# Patient Record
Sex: MALE | Race: WHITE | Employment: OTHER | URBAN - METROPOLITAN AREA
[De-identification: names, ages, dates, MRNs, and addresses within clinical notes are randomized per-mention and may not be internally consistent; named-entity substitution may affect disease eponyms.]

---

## 2017-01-30 ENCOUNTER — OFFICE VISIT (OUTPATIENT)
Dept: FAMILY MEDICINE CLINIC | Age: 77
End: 2017-01-30

## 2017-01-30 VITALS
HEART RATE: 85 BPM | DIASTOLIC BLOOD PRESSURE: 80 MMHG | OXYGEN SATURATION: 95 % | HEIGHT: 71 IN | SYSTOLIC BLOOD PRESSURE: 130 MMHG | WEIGHT: 215 LBS | BODY MASS INDEX: 30.1 KG/M2 | TEMPERATURE: 98 F | RESPIRATION RATE: 16 BRPM

## 2017-01-30 DIAGNOSIS — J06.9 VIRAL UPPER RESPIRATORY TRACT INFECTION: Primary | ICD-10-CM

## 2017-01-30 DIAGNOSIS — Z20.818 EXPOSURE TO STREP THROAT: ICD-10-CM

## 2017-01-30 LAB
S PYO AG THROAT QL: NEGATIVE
VALID INTERNAL CONTROL?: YES

## 2017-01-30 RX ORDER — TOFACITINIB 5 MG/1
TABLET, FILM COATED ORAL 2 TIMES DAILY
COMMUNITY

## 2017-01-30 RX ORDER — CAPECITABINE 150 MG/1
1250 TABLET, FILM COATED ORAL 2 TIMES DAILY
COMMUNITY
End: 2017-01-30 | Stop reason: CLARIF

## 2017-01-30 NOTE — PATIENT INSTRUCTIONS
Symptomatic treatment (cough medication, expectorants, etc)    When you see your mother, if you have a cough wear a mask. Cough or no cough, sanitize your hands frequently around your mom and her roommate. Recheck as needed.

## 2017-01-30 NOTE — PROGRESS NOTES
HISTORY OF PRESENT ILLNESS  Yeimi Walker is a 78 y.o. male. HPI Comments: Mr Wilbur Vargas is here today because of a cough for two days. His wife was also just diagnosed with strep so he wants to be checked. The main reason he's being so cautious is because he is going to see him 8 year old mother tomorrow at [facility]. And doesn't want her to get anything. Pt has had no fever, chills. Cough is dry. His throat \"feels a little thick\". Cough   Pertinent negatives include no chest pain and no headaches. Review of Systems   Constitutional: Negative for chills, fever and malaise/fatigue. HENT: Positive for sore throat. Negative for congestion, ear pain, nosebleeds and tinnitus. Eyes: Negative for blurred vision and discharge. Respiratory: Positive for cough. Negative for hemoptysis, sputum production and wheezing. Cardiovascular: Negative for chest pain and palpitations. Gastrointestinal: Negative for heartburn. Genitourinary: Negative for dysuria and urgency. Neurological: Negative for headaches. Physical Exam   Constitutional: He appears well-developed and well-nourished. HENT:   Right Ear: Tympanic membrane, external ear and ear canal normal.   Left Ear: Tympanic membrane, external ear and ear canal normal.   Nose: Nose normal.   Mouth/Throat: Uvula is midline, oropharynx is clear and moist and mucous membranes are normal. No posterior oropharyngeal erythema. Eyes: Conjunctivae are normal. Pupils are equal, round, and reactive to light. Right conjunctiva is not injected. Left conjunctiva is not injected. Neck: Neck supple. No thyromegaly present. Cardiovascular: Normal rate and regular rhythm. Pulmonary/Chest: Effort normal and breath sounds normal. No respiratory distress. He has no wheezes. He has no rales. Lymphadenopathy:     He has no cervical adenopathy. Skin: No rash noted. Nursing note and vitals reviewed.     Results for orders placed or performed in visit on 01/30/17   AMB POC RAPID STREP A   Result Value Ref Range    VALID INTERNAL CONTROL POC Yes     Group A Strep Ag Negative Negative       ASSESSMENT and PLAN    ICD-10-CM ICD-9-CM    1. Viral upper respiratory tract infection J06.9 465.9     B97.89     2.  Exposure to strep throat Z20.818 V01.89 AMB POC RAPID STREP A       AVS instructions reviewed with patient, pt verbalized understanding

## 2017-01-30 NOTE — MR AVS SNAPSHOT
Visit Information Date & Time Provider Department Dept. Phone Encounter #  
 1/30/2017 12:45 PM [facility] [phone] [id number] Follow-up Instructions Return if symptoms worsen or fail to improve. Upcoming Health Maintenance Date Due DTaP/Tdap/Td series (1 - Tdap) 12/1/1963 FOBT Q 1 YEAR AGE 50-75 12/1/1992 ZOSTER VACCINE AGE 60> 12/1/2002 GLAUCOMA SCREENING Q2Y 12/1/2007 Pneumococcal 65+ Low/Medium Risk (1 of 2 - PCV13) 12/1/2007 MEDICARE YEARLY EXAM 12/1/2007 INFLUENZA AGE 9 TO ADULT 8/1/2016 Allergies as of 1/30/2017  Review Complete On: 1/30/2017 By: Danny Lozada LPN No Known Allergies Current Immunizations  Never Reviewed No immunizations on file. Not reviewed this visit You Were Diagnosed With   
  
 Codes Comments Viral upper respiratory tract infection    -  Primary ICD-10-CM: J06.9, B97.89 ICD-9-CM: 465.9 Exposure to strep throat     ICD-10-CM: Z20.818 ICD-9-CM: V01.89 Vitals BP Pulse Temp Resp Height(growth percentile) Weight(growth percentile) 130/80 (BP 1 Location: Right arm, BP Patient Position: Sitting) 85 98 °F (36.7 °C) (Oral) 16 5' 11\" (1.803 m) 215 lb (97.5 kg) SpO2 BMI Smoking Status 95% 29.99 kg/m2 Former Smoker Vitals History BMI and BSA Data Body Mass Index Body Surface Area  
 29.99 kg/m 2 2.21 m 2 Preferred Pharmacy Pharmacy Name Phone [id number], [address] [place] [address] Asim Chilel [phone] Your Updated Medication List  
  
   
This list is accurate as of: 1/30/17 12:54 PM.  Always use your most recent med list.  
  
  
  
  
 ANN 5-40 mg Tab Generic drug:  amLODIPine-Olmesartan  
  
 folic acid 1 mg tablet Commonly known as:  Google LORazepam 1 mg tablet Commonly known as:  ATIVAN Take 1 Tab by mouth every six (6) hours as needed (VERTIGO).  Max Daily Amount: 4 mg.  
  
 meclizine 25 mg tablet Commonly known as:  ANTIVERT Take 1 Tab by mouth three (3) times daily as needed. Indications: VERTIGO  
  
 methotrexate (PF) 10 mg/0.2 mL Atin  
0.6 mL. XELJANZ 5 mg tab Generic drug:  tofacitinib Take  by mouth two (2) times a day. We Performed the Following AMB POC RAPID STREP A [85174 CPT(R)] Follow-up Instructions Return if symptoms worsen or fail to improve. Patient Instructions Symptomatic treatment (cough medication, expectorants, etc) When you see your mother, if you have a cough wear a mask. Cough or no cough, sanitize your hands frequently around your mom and her roommate. Recheck as needed. Introducing \[facility]\"" & HEALTH SERVICES! Moar Stinson introduces [email or web address] patient portal. Now you can access parts of your medical record, email your doctor's office, and request medication refills online. 1. In your internet browser, go to [email or web address] 2. Click on the First Time User? Click Here link in the Sign In box. You will see the New Member Sign Up page. 3. Enter your [email or web address] Access Code exactly as it appears below. You will not need to use this code after youve completed the sign-up process. If you do not sign up before the expiration date, you must request a new code. · [email or web address] Access Code: [email or web address] Expires: 2/16/2017 10:33 AM 
 
4. Enter the last four digits of your Social Security Number (xxxx) and Date of Birth (mm/dd/yyyy) as indicated and click Submit. You will be taken to the next sign-up page. 5. Create a Yatangot ID. This will be your [email or web address] login ID and cannot be changed, so think of one that is secure and easy to remember. 6. Create a [email or web address] password. You can change your password at any time. 7. Enter your Password Reset Question and Answer. This can be used at a later time if you forget your password. 8. Enter your e-mail address. You will receive e-mail notification when new information is available in [address] 9. Click Sign Up. You can now view and download portions of your medical record. 10. Click the Download Summary menu link to download a portable copy of your medical information. If you have questions, please visit the Frequently Asked Questions section of the MakeLeaps website. Remember, MakeLeaps is NOT to be used for urgent needs. For medical emergencies, dial 911. Now available from your iPhone and Android! Please provide this summary of care documentation to your next provider. If you have any questions after today's visit, please call [phone].

## 2017-01-30 NOTE — PROGRESS NOTES
Rm 1  Pt presents to the clinic complaining of a nonproductive cough x 2 days. Pt's wants to be sure he is not contagious. Flu shot requested: no    Depression Screening Completed: yes    Learning Assessment Completed: yes    Abuse Screening Completed: n/a    Health Maintenance reviewed and discussed per provider: yes    Advance Directive:    1. Do you have an advance directive in place? Patient Reply: no    2. If not, would you like material regarding how to put one in place? Patient Reply: no     Coordination of Care:    1. Have you been to the ER, urgent care clinic since your last visit? Hospitalized since your last visit? no    2. Have you seen or consulted any other health care providers outside of the [facility] since your last visit? Include any pap smears or colon screening.  no

## 2017-02-21 NOTE — PATIENT DISCUSSION
AMD (DRY), OU___:  PRESCRIBE AREDS 2 VITAMINS / AMSLER GRID QD/ UV PROTECTION. SMOKING CESSATION EMPHASIZED. RETURN FOR FOLLOW-UP AS SCHEDULED.

## 2017-02-21 NOTE — PATIENT DISCUSSION
Surgery Counseling: I have discussed the option of scheduling surgery versus following, as well as the risks, benefits and alternatives of cataract surgery with the patient. It was explained that the surgery is medically indicated at this time, and it can be performed at the patient's option as delaying will cause no further deterioration, therefore there is no rush and there is no harm in waiting to have surgery. It was also explained that there is no guarantee that removing the cataract will improve their vision. The patient understands and desires to proceed with cataract surgery with the implantation of an intraocular lens to improve vision for _DRIVING AND READING____. I have given the patient the prescribed regimen of the all-in-one drop to use before and after cataract surgery. They have elected to use the all-in-one option of Pred/Gati/Nepaf(prednisolone acetate,gatifloxacin,and nepafenac. Patient to administer as directed.

## 2018-05-10 ENCOUNTER — IMPORTED ENCOUNTER (OUTPATIENT)
Dept: URBAN - METROPOLITAN AREA CLINIC 1 | Facility: CLINIC | Age: 78
End: 2018-05-10

## 2018-05-10 PROBLEM — H43.813: Noted: 2018-05-10

## 2018-05-10 PROBLEM — D48.5: Noted: 2018-05-10

## 2018-05-10 PROBLEM — H25.813: Noted: 2018-05-10

## 2018-05-10 PROBLEM — H04.123: Noted: 2018-05-10

## 2018-05-10 PROBLEM — H35.033: Noted: 2018-05-10

## 2018-05-10 PROBLEM — D23.12: Noted: 2018-05-10

## 2018-05-10 PROCEDURE — 92015 DETERMINE REFRACTIVE STATE: CPT

## 2018-05-10 PROCEDURE — 92014 COMPRE OPH EXAM EST PT 1/>: CPT

## 2018-05-10 NOTE — PATIENT DISCUSSION
Lesion lower lid benign OS - The patient has a lesion of the left lower eyelid which appears benign. Measurements were taken and observation at regular intervals was recommended. The patient was asked to report  if there is any growth.

## 2018-05-10 NOTE — PATIENT DISCUSSION
1.  Cataract OU: Observe for now without intervention. The patient was advised to contact us if any change or worsening of vision2. Dry Eyes OU: Stable. The continuation of artificial tears were recommended. 3.  RLL Mass of uncertain behavior- Observe. 4.  Papilloma LL- appears benign. 5.  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 6.  GR I Hypertensive Retinopathy OU- Stable continue HTN Control7. Return for an appointment for a 30 /glare in 1 year with Dr. Agustin Aragon.

## 2018-08-06 ENCOUNTER — IMPORTED ENCOUNTER (OUTPATIENT)
Dept: URBAN - METROPOLITAN AREA CLINIC 1 | Facility: CLINIC | Age: 78
End: 2018-08-06

## 2018-08-06 PROBLEM — G43.909: Noted: 2018-08-06

## 2018-08-06 PROBLEM — G45.9: Noted: 2018-08-06

## 2018-08-06 PROCEDURE — 92012 INTRM OPH EXAM EST PATIENT: CPT

## 2018-08-06 NOTE — PATIENT DISCUSSION
Transient Ischemic Attack - Condition and reassurance given. Recommend vascular w/u per PCP. Pt states his PCP has already ordered a Carotid Duplex. If symptoms return advised pt to call our office immediately. Letter sent to PCP. Return as scheduled in May with Dr. Cb Almonte.

## 2018-08-20 ENCOUNTER — OFFICE VISIT (OUTPATIENT)
Dept: FAMILY MEDICINE CLINIC | Age: 78
End: 2018-08-20

## 2018-08-20 VITALS
BODY MASS INDEX: 31.56 KG/M2 | TEMPERATURE: 98.3 F | WEIGHT: 225.4 LBS | RESPIRATION RATE: 12 BRPM | OXYGEN SATURATION: 94 % | SYSTOLIC BLOOD PRESSURE: 147 MMHG | HEIGHT: 71 IN | DIASTOLIC BLOOD PRESSURE: 89 MMHG | HEART RATE: 80 BPM

## 2018-08-20 DIAGNOSIS — H92.01 RIGHT EAR PAIN: Primary | ICD-10-CM

## 2018-08-20 RX ORDER — CHOLECALCIFEROL TAB 125 MCG (5000 UNIT) 125 MCG
TAB ORAL DAILY
COMMUNITY

## 2018-08-20 RX ORDER — NEOMYCIN SULFATE, POLYMYXIN B SULFATE AND HYDROCORTISONE 10; 3.5; 1 MG/ML; MG/ML; [USP'U]/ML
3 SUSPENSION/ DROPS AURICULAR (OTIC) 4 TIMES DAILY
Qty: 10 ML | Refills: 0 | Status: SHIPPED | OUTPATIENT
Start: 2018-08-20 | End: 2018-08-25

## 2018-08-20 RX ORDER — LANOLIN ALCOHOL/MO/W.PET/CERES
500 CREAM (GRAM) TOPICAL DAILY
COMMUNITY

## 2018-08-20 NOTE — PATIENT INSTRUCTIONS
No treatment for now. If the ear canal becomes tender to move or touch, fill the prescription and use as directed. Recheck as needed.

## 2018-08-20 NOTE — MR AVS SNAPSHOT
[address] [id number] 
[phone] Patient: Branden Florentino MRN: [id number] Visit Information Date & Time Provider Department Dept. Phone Encounter #  
 8/20/2018 12:45 PM Mervin Zamorano [facility] [phone] [id number] Follow-up Instructions Return if symptoms worsen or fail to improve. Upcoming Health Maintenance Date Due DTaP/Tdap/Td series (1 - Tdap) 12/1/1963 ZOSTER VACCINE AGE 60> 10/1/2002 GLAUCOMA SCREENING Q2Y 12/1/2007 Pneumococcal 65+ Low/Medium Risk (1 of 2 - PCV13) 12/1/2007 MEDICARE YEARLY EXAM 3/14/2018 Influenza Age 5 to Adult 8/1/2018 Allergies as of 8/20/2018  Review Complete On: 8/20/2018 By: Rubens Moyer No Known Allergies Current Immunizations  Never Reviewed No immunizations on file. Not reviewed this visit You Were Diagnosed With   
  
 Codes Comments Right ear pain    -  Primary ICD-10-CM: H92.01 
ICD-9-CM: 388.70 Vitals BP Pulse Temp Resp Height(growth percentile) Weight(growth percentile) 147/89 (BP 1 Location: Right arm, BP Patient Position: Sitting) 80 98.3 °F (36.8 °C) (Oral) 12 5' 11\" (1.803 m) 225 lb 6.4 oz (102.2 kg) SpO2 BMI Smoking Status 94% 31.44 kg/m2 Former Smoker Vitals History BMI and BSA Data Body Mass Index Body Surface Area  
 31.44 kg/m 2 2.26 m 2 Preferred Pharmacy Pharmacy Name Phone [id number] Wyatt Specter [phone] Your Updated Medication List  
  
   
This list is accurate as of 8/20/18  1:13 PM.  Always use your most recent med list.  
  
  
  
  
 ANN 5-40 mg Tab Generic drug:  amLODIPine-Olmesartan  
  
 cyanocobalamin 500 mcg tablet Commonly known as:  VITAMIN B12 Take 500 mcg by mouth daily.   
  
 neomycin-polymyxin-hydrocortisone (buffered) 3.5-10,000-1 mg/mL-unit/mL-% otic suspension Commonly known as:  Erroll Life Administer 3 Drops in right ear four (4) times daily for 5 days. VITAMIN D3 5,000 unit Tab tablet Generic drug:  cholecalciferol (VITAMIN D3) Take  by mouth daily. XELJANZ 5 mg tab Generic drug:  tofacitinib Take  by mouth two (2) times a day. Prescriptions Printed Refills  
 neomycin-polymyxin-hydrocortisone, buffered, (PEDIOTIC) 3.5-10,000-1 mg/mL-unit/mL-% otic suspension 0 Sig: Administer 3 Drops in right ear four (4) times daily for 5 days. Class: Print Route: Right Ear Follow-up Instructions Return if symptoms worsen or fail to improve. Patient Instructions No treatment for now. If the ear canal becomes tender to move or touch, fill the prescription and use as directed. Recheck as needed. Introducing [facility] & HEALTH SERVICES! Gracia Grant introduces DigitalTown patient portal. Now you can access parts of your medical record, email your doctor's office, and request medication refills online. 1. In your internet browser, go to [email or web address] 2. Click on the First Time User? Click Here link in the Sign In box. You will see the New Member Sign Up page. 3. Enter your DigitalTown Access Code exactly as it appears below. You will not need to use this code after youve completed the sign-up process. If you do not sign up before the expiration date, you must request a new code. · DigitalTown Access Code: [email or web address] Expires: 11/18/2018  1:13 PM 
 
4. Enter the last four digits of your Social Security Number (xxxx) and Date of Birth (mm/dd/yyyy) as indicated and click Submit. You will be taken to the next sign-up page. 5. Create a Kaleiot ID. This will be your DigitalTown login ID and cannot be changed, so think of one that is secure and easy to remember. 6. Create a Kaleiot password. You can change your password at any time. 7. Enter your Password Reset Question and Answer. This can be used at a later time if you forget your password. 8. Enter your e-mail address. You will receive e-mail notification when new information is available in [address] 9. Click Sign Up. You can now view and download portions of your medical record. 10. Click the Download Summary menu link to download a portable copy of your medical information. If you have questions, please visit the Frequently Asked Questions section of the [employer] website. Remember, [employer] is NOT to be used for urgent needs. For medical emergencies, dial 911. Now available from your iPhone and Android! Please provide this summary of care documentation to your next provider. If you have any questions after today's visit, please call [phone].

## 2018-08-20 NOTE — PROGRESS NOTES
Rm 1  Pt presents to the clinic   Chief Complaint   Patient presents with    Ear Pain     right ear, sharp, about every 2-3 hours x 2 days ago from the beach

## 2018-08-20 NOTE — PROGRESS NOTES
HISTORY OF PRESENT ILLNESS  Nain Johnston is a 78 y.o. male. HPI Comments: Mr. Holli Rodriguez c/o intermittent R ear pain for several days, after going to the beach. No cold symptoms, no drainage. Ear Pain   Pertinent negatives include no chest pain, no abdominal pain, no headaches and no shortness of breath. Review of Systems   Constitutional: Negative for chills and fever. HENT: Positive for ear pain. Negative for congestion, ear discharge and sinus pain. Eyes: Negative for blurred vision and double vision. Respiratory: Negative for cough and shortness of breath. Cardiovascular: Negative for chest pain and palpitations. Gastrointestinal: Negative for abdominal pain, nausea and vomiting. Neurological: Negative for headaches. Physical Exam   Constitutional: He is oriented to person, place, and time. He appears well-developed and well-nourished. HENT:   R ear: ear canal fine, TM looks normal.   Eyes: Pupils are equal, round, and reactive to light. Neck: Neck supple. Cardiovascular: Normal rate and regular rhythm. Pulmonary/Chest: Effort normal and breath sounds normal. No respiratory distress. He has no wheezes. He has no rales. Abdominal: Soft. He exhibits no distension. Lymphadenopathy:     He has no cervical adenopathy. Neurological: He is alert and oriented to person, place, and time. Nursing note and vitals reviewed. ASSESSMENT and PLAN    ICD-10-CM ICD-9-CM    1.  Right ear pain H92.01 388.70 neomycin-polymyxin-hydrocortisone, buffered, (PEDIOTIC) 3.5-10,000-1 mg/mL-unit/mL-% otic suspension

## 2018-10-31 NOTE — PATIENT DISCUSSION
Surgery Counseling: I have discussed the option of scheduling surgery versus following, as well as the risks, benefits and alternatives of cataract surgery with the patient. It was explained that the surgery is medically indicated at this time, and it can be performed at the patient's option as delaying will cause no further deterioration, therefore there is no rush and there is no harm in waiting to have surgery. It was also explained that there is no guarantee that removing the cataract will improve their vision. The patient understands and desires to proceed with cataract surgery with the implantation of an intraocular lens to improve vision for ____driving and reading _______. I have given the patient the prescribed regimen of the all-in-one drop to use before and after cataract surgery. They have elected to use the all-in-one option of Pred/Gati/Brom(prednisolone acetate,gatifloxacin,and bromfenac. Patient to administer as directed.

## 2018-10-31 NOTE — PATIENT DISCUSSION
CATARACTS, OU - VISUALLY SIGNIFICANT. SCHEDULE _od_ FIRST THEN LATER IN __os  DISCUSSED OPTION OF _STD OU_______________VS MULTIFOCAL OU__________________. PATIENT UNDERSTANDS AND DESIRES _STD OU____.

## 2018-12-28 NOTE — PATIENT DISCUSSION
(Z63.521) Other secondary cataract, left eye - Assesment : Trace posterior capsule opacification present. - Plan : Monitor for Changes. Advised patient to call our office with decreased vision or increased symptoms.

## 2018-12-28 NOTE — PATIENT DISCUSSION
(H43.811) Vitreous degeneration, right eye - Assesment : Examination revealed a posterior vitreous detachment. No holes or tears 360 Diagnoses discussed with patient - Plan : Handouts given on posterior vitreous detachment and risk factors discussed for retinal detachment development. Advised to call immediately with any changes.  Refer to retina specialist for further evaluation Rtc PRN

## 2019-01-02 ENCOUNTER — OFFICE VISIT (OUTPATIENT)
Dept: FAMILY MEDICINE CLINIC | Age: 79
End: 2019-01-02

## 2019-01-02 VITALS
RESPIRATION RATE: 16 BRPM | BODY MASS INDEX: 31.92 KG/M2 | DIASTOLIC BLOOD PRESSURE: 84 MMHG | SYSTOLIC BLOOD PRESSURE: 167 MMHG | HEART RATE: 77 BPM | WEIGHT: 228 LBS | HEIGHT: 71 IN | TEMPERATURE: 98 F | OXYGEN SATURATION: 96 %

## 2019-01-02 DIAGNOSIS — J06.9 VIRAL URI WITH COUGH: Primary | ICD-10-CM

## 2019-01-02 DIAGNOSIS — I10 ESSENTIAL HYPERTENSION: ICD-10-CM

## 2019-01-02 RX ORDER — CODEINE PHOSPHATE AND GUAIFENESIN 10; 100 MG/5ML; MG/5ML
10 SOLUTION ORAL
Qty: 180 ML | Refills: 0 | Status: SHIPPED | OUTPATIENT
Start: 2019-01-02 | End: 2019-04-01

## 2019-01-02 NOTE — PATIENT INSTRUCTIONS
Take the cough medicine as prescribed, as needed. If the cough is mild, take 1 tsp, otherwise take 2 tsp. Symptomatic over the counter treatment like zycam and Vitamin C. Recheck Monday if you're not doing better. Get back on the Jasvir

## 2019-01-02 NOTE — PROGRESS NOTES
HISTORY OF PRESENT ILLNESS Reta Ospina is a 70 y.o. male. Mr. Robert Holland is here because of a cough for 6-7 days. No fever, chills, sore throat. Non-productive. No headache. His BP is slightly up today, he hasn't taken his Jasvir today yet Review of Systems Constitutional: Positive for malaise/fatigue. Negative for chills and fever. HENT: Negative for congestion, ear pain, nosebleeds, sore throat and tinnitus. Eyes: Negative for blurred vision and discharge. Respiratory: Positive for cough. Negative for hemoptysis, sputum production and wheezing. Cardiovascular: Negative for chest pain and palpitations. Gastrointestinal: Negative for heartburn. Genitourinary: Negative for dysuria and urgency. Neurological: Positive for headaches. Physical Exam  
Constitutional: He appears well-developed and well-nourished. HENT:  
Right Ear: Tympanic membrane, external ear and ear canal normal.  
Left Ear: Tympanic membrane, external ear and ear canal normal.  
Nose: Nose normal.  
Mouth/Throat: Uvula is midline, oropharynx is clear and moist and mucous membranes are normal. No posterior oropharyngeal erythema. Eyes: Conjunctivae are normal. Pupils are equal, round, and reactive to light. Right conjunctiva is not injected. Left conjunctiva is not injected. Neck: Neck supple. No thyromegaly present. Cardiovascular: Normal rate and regular rhythm. Pulmonary/Chest: Effort normal and breath sounds normal. No respiratory distress. He has no wheezes. He has no rales. Abdominal: He exhibits no distension. Lymphadenopathy:  
  He has no cervical adenopathy. Skin: No rash noted. Nursing note and vitals reviewed. ASSESSMENT and PLAN 
  ICD-10-CM ICD-9-CM 1. Viral URI with cough J06.9 465.9 guaiFENesin-codeine (ROBITUSSIN AC) 100-10 mg/5 mL solution B97.89    
2. Essential hypertension I10 401.9

## 2019-01-02 NOTE — PROGRESS NOTES
Rm:2 
 
Chief Complaint Patient presents with  Cold Symptoms  
  cough, sinus drainage Depression Screening: PHQ over the last two weeks 1/2/2019 1/2/2019 8/20/2018 11/18/2016 11/18/2016 Little interest or pleasure in doing things Not at all Not at all Not at all Not at all Not at all Feeling down, depressed, irritable, or hopeless Not at all Not at all Not at all Not at all Not at all Total Score PHQ 2 0 0 0 0 0 Learning Assessment: 
Learning Assessment 11/18/2016 PRIMARY LEARNER Patient HIGHEST LEVEL OF EDUCATION - PRIMARY LEARNER  2 YEARS OF COLLEGE  
BARRIERS PRIMARY LEARNER NONE  
CO-LEARNER CAREGIVER No  
PRIMARY LANGUAGE ENGLISH  
LEARNER PREFERENCE PRIMARY LISTENING  
ANSWERED BY patient RELATIONSHIP SELF Abuse Screening: No flowsheet data found. Health Maintenance reviewed and discussed per provider: yes Coordination of Care: 1. Have you been to the ER, urgent care clinic since your last visit? Hospitalized since your last visit? no 
 
2. Have you seen or consulted any other health care providers outside of the [address] [place] [address] since your last visit? Include any pap smears or colon screening.  no

## 2019-01-04 NOTE — PROCEDURE NOTE: CLINICAL
PROCEDURE NOTE: Laser for Retinal Tear OD. Diagnosis: Retinal Hole. Prior to laser, risks/benefits/alternatives to laser discussed including loss of vision, decreased peripheral and night vision, need for more laser and/or surgery and patient wished to proceed. An informed consent was obtained and no assurances or guarantees were given. Spot size: * um. Pulse power: 400 mW. Pulse duration: 100 ms. Number of pulses: 318. Procedure Time: 4:53 PM . Patient tolerated procedure well. There were no complications. Post-op instructions given. Patient given office phone number/answering service number and advised to call immediately should there be loss of vision or pain, or should they have any other questions or concerns. Muna Reza

## 2019-01-04 NOTE — PATIENT DISCUSSION
Capsular haze appears visually significant, RECOMMEND CONSULT with DR Zachary Pope for possible YAG capsulotomy.

## 2019-01-04 NOTE — PATIENT DISCUSSION
NO ^ [address] [place] TO SUGGEST OPTIC NEURITIS OR PAPELLEDEMA DESPITE MARGIN OF NERVE APPEARING INDISTINCT.

## 2019-01-11 NOTE — PROCEDURE NOTE: CLINICAL
PROCEDURE NOTE: Laser for Retinal Tear OS. Diagnosis: Horseshoe Tear. Prior to laser, risks/benefits/alternatives to laser discussed including loss of vision, decreased peripheral and night vision, need for more laser and/or surgery and patient wished to proceed. An informed consent was obtained and no assurances or guarantees were given. Spot size: 200 um. Pulse power: 200 mW. Pulse duration: 100 ms. Number of pulses: 114. Procedure Time: 6858. Patient tolerated procedure well. There were no complications. Post-op instructions given. Patient given office phone number/answering service number and advised to call immediately should there be loss of vision or pain, or should they have any other questions or concerns. She Roberts

## 2019-01-11 NOTE — PATIENT DISCUSSION
Capsular haze appears visually significant, RECOMMEND CONSULT with DR Demi Love for possible YAG capsulotomy.

## 2019-02-08 NOTE — PATIENT DISCUSSION
Capsular haze appears visually significant, RECOMMEND CONSULT with DR Michele Baez for possible YAG capsulotomy.

## 2019-03-29 NOTE — PATIENT DISCUSSION
(H26.549) Other secondary cataract, right eye - Assesment : Trace posterior capsule opacification present OD. - Plan : Discussed that opacity is the cause of the decreased vision. Discussed the risks and benefits of performing a YAG Capsulotomy including the risk of floaters, retinal detachment, and retinal swelling. Patient understands to expect floaters after the YAG capsulotomy procedures and understands that they may remain indefinitely. Recommend that patient undergo a YAG Capsulotomy OD (Right Eye).   RTC 2 weeks follow up

## 2019-05-09 ENCOUNTER — IMPORTED ENCOUNTER (OUTPATIENT)
Dept: URBAN - METROPOLITAN AREA CLINIC 1 | Facility: CLINIC | Age: 79
End: 2019-05-09

## 2019-05-09 PROBLEM — H04.123: Noted: 2019-05-09

## 2019-05-09 PROBLEM — H25.813: Noted: 2019-05-09

## 2019-05-09 PROBLEM — H43.813: Noted: 2019-05-09

## 2019-05-09 PROBLEM — H16.143: Noted: 2019-05-09

## 2019-05-09 PROBLEM — H35.033: Noted: 2019-05-09

## 2019-05-09 PROCEDURE — 92014 COMPRE OPH EXAM EST PT 1/>: CPT

## 2019-05-09 PROCEDURE — 92015 DETERMINE REFRACTIVE STATE: CPT

## 2019-05-09 NOTE — PATIENT DISCUSSION
1.  Cataract OU: Observe for now without intervention. The patient was advised to contact us if any change or worsening of vision2. LOGAN w/ PEK OU- Recommend ATs TID OU routinely 3. GR I Hypertensive Retinopathy OU- Stable continue HTN Control4. PVD OU - RD precautions. 5.  RLL Mass of uncertain behavior- Observe. 6.  Papilloma LL- appears benign. MRX for glasses given. Return for an appointment in 1 year 30/glare with Dr. Scotty Reid.
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
## 2020-03-06 NOTE — PATIENT DISCUSSION
Capsular haze appears visually significant, RECOMMEND CONSULT with DR Minnie Ford for possible YAG capsulotomy.

## 2020-05-11 ENCOUNTER — IMPORTED ENCOUNTER (OUTPATIENT)
Dept: URBAN - METROPOLITAN AREA CLINIC 1 | Facility: CLINIC | Age: 80
End: 2020-05-11

## 2020-05-11 PROBLEM — H04.123: Noted: 2020-05-11

## 2020-05-11 PROBLEM — H25.813: Noted: 2020-05-11

## 2020-05-11 PROBLEM — H16.143: Noted: 2020-05-11

## 2020-05-11 PROBLEM — H35.033: Noted: 2020-05-11

## 2020-05-11 PROBLEM — H43.813: Noted: 2020-05-11

## 2020-05-11 PROCEDURE — 92014 COMPRE OPH EXAM EST PT 1/>: CPT

## 2020-05-11 NOTE — PATIENT DISCUSSION
1.  Cataract OU -- Observe until patient symptomatic. 2.  LOGAN w/ PEK OU -- Cont ATs BID OU. 3.  GR I Hypertensive Retinopathy OU -- Stable continue HTN Control4. PVD OU -- RD precautions. 5.  RLL Mass of uncertain behavior -- Observe. 6.  Papilloma LL -- Appears benign. Pt deferes Mrx. Return for an appointment in 6 mon 10/dfe/glarandell with Dr. Santiago Stokes.

## 2020-11-09 ENCOUNTER — IMPORTED ENCOUNTER (OUTPATIENT)
Dept: URBAN - METROPOLITAN AREA CLINIC 1 | Facility: CLINIC | Age: 80
End: 2020-11-09

## 2020-11-09 PROBLEM — H04.123: Noted: 2020-11-09

## 2020-11-09 PROBLEM — H35.033: Noted: 2020-11-09

## 2020-11-09 PROBLEM — H25.813: Noted: 2020-11-09

## 2020-11-09 PROBLEM — H40.053: Noted: 2020-11-09

## 2020-11-09 PROBLEM — H16.143: Noted: 2020-11-09

## 2020-11-09 PROCEDURE — 92012 INTRM OPH EXAM EST PATIENT: CPT

## 2020-11-09 NOTE — PATIENT DISCUSSION
1.  Cataract OU -- Observe for now without intervention. The patient was advised to contact us if any change or worsening of vision2. OHTN -- IOP 22 OU today. Observe for changes/progression. 3. LOGAN w/ PEK OU -- Cont ATs BID OU. 4.  GR I Hypertensive Retinopathy OU- Stable continue HTN Control5. PVD OU -- RD precautions. 6.  RLL Mass of uncertain behavior -- Observe. 7.  Papilloma LL -- Appears benign. Return for an appointment in 1 year for a 30/glare with Dr. Corona Díaz.

## 2021-02-05 NOTE — PATIENT DISCUSSION
Capsular haze appears visually significant, RECOMMEND CONSULT with DR Claudia Yen for possible YAG capsulotomy.

## 2021-06-23 NOTE — PATIENT DISCUSSION
MIRTA RUL__: PRESCRIBED WARM COMPRESSES, EYELID SCRUBS AND _MAXITROL_ FOLLOW UP WITH DR. Eleni Mccann IF PROBLEM PERSISTS.

## 2021-07-15 PROBLEM — K43.9 VENTRAL HERNIA WITHOUT OBSTRUCTION OR GANGRENE: Status: ACTIVE | Noted: 2018-08-06

## 2021-07-15 PROBLEM — M13.0 POLYARTHRITIS: Status: ACTIVE | Noted: 2021-07-15

## 2021-07-15 PROBLEM — R82.90 ABNORMAL URINALYSIS: Status: ACTIVE | Noted: 2021-07-15

## 2021-07-15 PROBLEM — E55.9 VITAMIN D DEFICIENCY: Status: ACTIVE | Noted: 2019-11-21

## 2021-07-15 PROBLEM — M47.812 OSTEOARTHRITIS OF CERVICAL SPINE: Status: ACTIVE | Noted: 2020-09-03

## 2021-07-15 PROBLEM — E78.00 ELEVATED CHOLESTEROL: Status: ACTIVE | Noted: 2018-09-04

## 2021-07-15 PROBLEM — D17.1 LIPOMA OF SKIN AND SUBCUTANEOUS TISSUE OF TRUNK: Status: ACTIVE | Noted: 2021-07-15

## 2021-07-15 PROBLEM — K43.2 INCISIONAL HERNIA: Status: ACTIVE | Noted: 2021-04-09

## 2021-07-15 PROBLEM — M75.82 TENDINITIS OF LEFT ROTATOR CUFF: Status: ACTIVE | Noted: 2021-07-15

## 2021-07-15 PROBLEM — Z87.891 EX-SMOKER: Status: ACTIVE | Noted: 2021-07-15

## 2021-07-15 PROBLEM — M75.80 ROTATOR CUFF TENDINITIS: Status: ACTIVE | Noted: 2021-07-15

## 2021-07-15 PROBLEM — T41.45XA ADVERSE EFFECT OF ANESTHESIA: Status: ACTIVE | Noted: 2021-07-15

## 2021-07-15 PROBLEM — Z79.52 LONG TERM CURRENT USE OF SYSTEMIC STEROIDS: Status: ACTIVE | Noted: 2021-07-15

## 2021-07-15 PROBLEM — K63.5 COLON POLYP: Status: ACTIVE | Noted: 2021-07-15

## 2021-07-15 PROBLEM — Z87.19 S/P REPAIR OF VENTRAL HERNIA: Status: ACTIVE | Noted: 2021-04-29

## 2021-07-15 PROBLEM — R55 SYNCOPE AND COLLAPSE: Status: ACTIVE | Noted: 2019-11-15

## 2021-07-15 PROBLEM — K04.7 PERIAPICAL ABSCESS WITHOUT SINUS: Status: ACTIVE | Noted: 2021-07-15

## 2021-07-15 PROBLEM — K57.30 DIVERTICULA OF COLON: Status: ACTIVE | Noted: 2021-07-15

## 2021-07-15 PROBLEM — L40.0 PSORIASIS VULGARIS: Status: ACTIVE | Noted: 2021-07-15

## 2021-07-15 PROBLEM — K27.9 PEPTIC ULCER, SITE UNSPECIFIED, UNSPECIFIED AS ACUTE OR CHRONIC, WITHOUT HEMORRHAGE OR PERFORATION: Status: ACTIVE | Noted: 2021-07-15

## 2021-07-15 PROBLEM — I10 ESSENTIAL HYPERTENSION: Status: ACTIVE | Noted: 2021-07-15

## 2021-07-15 PROBLEM — M65.80 CALCIUM DEPOSITS IN TENDON: Status: ACTIVE | Noted: 2021-07-15

## 2021-07-15 PROBLEM — Z98.890 S/P REPAIR OF VENTRAL HERNIA: Status: ACTIVE | Noted: 2021-04-29

## 2021-07-15 PROBLEM — D17.21 LIPOMA OF RIGHT SHOULDER: Status: ACTIVE | Noted: 2018-05-14

## 2021-07-15 PROBLEM — B02.9 HERPES ZOSTER WITHOUT COMPLICATION: Status: ACTIVE | Noted: 2017-08-04

## 2021-07-15 PROBLEM — N13.9 ACUTE UNILATERAL OBSTRUCTIVE UROPATHY: Status: ACTIVE | Noted: 2021-05-24

## 2021-07-15 PROBLEM — N17.9 ACUTE RENAL FAILURE (ARF) (HCC): Status: ACTIVE | Noted: 2021-05-25

## 2021-07-15 PROBLEM — Z71.3 DIETARY COUNSELING: Status: ACTIVE | Noted: 2018-10-08

## 2021-07-15 PROBLEM — Z90.49 S/P COLON RESECTION: Status: ACTIVE | Noted: 2021-07-15

## 2021-07-15 PROBLEM — K64.9 HEMORRHOID: Status: ACTIVE | Noted: 2021-07-15

## 2021-07-15 PROBLEM — R27.0 ATAXIA: Status: ACTIVE | Noted: 2020-01-15

## 2021-07-15 PROBLEM — R73.01 IFG (IMPAIRED FASTING GLUCOSE): Status: ACTIVE | Noted: 2021-04-29

## 2021-11-08 ENCOUNTER — IMPORTED ENCOUNTER (OUTPATIENT)
Dept: URBAN - METROPOLITAN AREA CLINIC 1 | Facility: CLINIC | Age: 81
End: 2021-11-08

## 2021-11-08 PROBLEM — H40.053: Noted: 2021-11-08

## 2021-11-08 PROBLEM — H43.813: Noted: 2021-11-08

## 2021-11-08 PROBLEM — H04.123: Noted: 2021-11-08

## 2021-11-08 PROBLEM — H16.143: Noted: 2021-11-08

## 2021-11-08 PROBLEM — H25.813: Noted: 2021-11-08

## 2021-11-08 PROCEDURE — 99214 OFFICE O/P EST MOD 30 MIN: CPT

## 2021-11-08 PROCEDURE — 92015 DETERMINE REFRACTIVE STATE: CPT

## 2021-11-08 NOTE — PATIENT DISCUSSION
1.  Cataract OU -- Visually Significant secondary to glare discussed the risks benefits alternatives and limitations of cataract surgery. The patient stated a full understanding and a desire to proceed with the procedure. The patient will need to return for preop appointment with cataract measurements and to have any additional questions answered and start pre-operative eye drops as directed. Phaco PCL OS then OD *Myopic GoalOtherwise follow-up 6 months DFE/Glare2. OHTN -- IOP 24/20 today. Observe for changes/progression. 3.  LOGAN w/ PEK OU -- Cont ATs BID OU routinely. 4.  GR I Hypertensive Retinopathy OU -- Stable continue HTN Control5. PVD OU -- Old stable. RD precautions. 6.  RLL Mass of uncertain behavior -- Observe. 7.  Papilloma LLL -- Stable. Appears benign. Return for an appointment for Ascan/HP with Dr. Jhon Vargas.

## 2022-01-06 ENCOUNTER — IMPORTED ENCOUNTER (OUTPATIENT)
Dept: URBAN - METROPOLITAN AREA CLINIC 1 | Facility: CLINIC | Age: 82
End: 2022-01-06

## 2022-01-06 PROBLEM — H25.812: Noted: 2022-01-06

## 2022-01-06 PROCEDURE — 92136 OPHTHALMIC BIOMETRY: CPT

## 2022-01-06 NOTE — PATIENT DISCUSSION
1. Cataract OS -- Visually Significant secondary to glare discussed the risks benefits alternatives and limitations of cataract surgery. The patient stated a full understanding and a desire to proceed with the procedure. Discussed with patient if PO Gtts are more than $120 for all three combined when filling at their Pharmacy please call our office to request generic substitutions. Pt came to pre-op appointment today by himself and Lifestyle Questionnaire Completed. Pt understands they will need glasses post-op to achieve their best corrected vision. Phaco PCL OS (Planning on Standard w/ LenSx) *Myopic Target Return for an appointment as scheduled with Dr. Cb Almonte.

## 2022-01-12 ENCOUNTER — IMPORTED ENCOUNTER (OUTPATIENT)
Dept: URBAN - METROPOLITAN AREA CLINIC 1 | Facility: CLINIC | Age: 82
End: 2022-01-12

## 2022-01-13 ENCOUNTER — IMPORTED ENCOUNTER (OUTPATIENT)
Dept: URBAN - METROPOLITAN AREA CLINIC 1 | Facility: CLINIC | Age: 82
End: 2022-01-13

## 2022-01-13 PROBLEM — Z96.1: Noted: 2022-01-13

## 2022-01-13 PROCEDURE — 99024 POSTOP FOLLOW-UP VISIT: CPT

## 2022-01-13 NOTE — PATIENT DISCUSSION
POD#1 CE/IOL OS (Standard/LenSx) doing well. *Dextenza in place* 1 drop Combigan and Durezol instilled in office today. Use Pred BID Prolensa QD and Ofloxacin TID: Use all three gtts through completion of PO gtt chart regimen/ Per our instructions given to patient.   Post op Warnings Reiterated RTC as scheduled

## 2022-01-18 ENCOUNTER — IMPORTED ENCOUNTER (OUTPATIENT)
Dept: URBAN - METROPOLITAN AREA CLINIC 1 | Facility: CLINIC | Age: 82
End: 2022-01-18

## 2022-01-18 PROBLEM — H25.811: Noted: 2022-01-18

## 2022-01-18 PROCEDURE — 92136 OPHTHALMIC BIOMETRY: CPT

## 2022-01-18 NOTE — PATIENT DISCUSSION
1.  Cataract OD - Visually Significant secondary to glare discussed the risks benefits alternatives and limitations of cataract surgery. The patient stated a full understanding and a desire to proceed with the procedure. Discussed with patient if PO Gtts are more than $120 for all three combined when filling at their Pharmacy please call our office to request generic substitutions. *Standard/LenSx/Myopic Goal*Phaco PCL OD 2. POW#2  CE/IOL OS (Standard/LenSx)  doing well. *Pricila Sous in place. Use gtts through completion of PO gtt chart regimen. F/u as scheduled 2[facility]

## 2022-01-26 ENCOUNTER — IMPORTED ENCOUNTER (OUTPATIENT)
Dept: URBAN - METROPOLITAN AREA CLINIC 1 | Facility: CLINIC | Age: 82
End: 2022-01-26

## 2022-01-27 ENCOUNTER — IMPORTED ENCOUNTER (OUTPATIENT)
Dept: URBAN - METROPOLITAN AREA CLINIC 1 | Facility: CLINIC | Age: 82
End: 2022-01-27

## 2022-01-27 PROCEDURE — 99024 POSTOP FOLLOW-UP VISIT: CPT

## 2022-01-27 NOTE — PATIENT DISCUSSION
1. POD#1 Phaco/ PCL Standrd w/ LenSx OD- doing well. Myopic GoalUse Prednisolone BID OD Prolensa Qdaily OD Ocuflox TID OD : Use all three gtts through completion of PO gtt chart regimen/ Per our instructions given. Post op Warnings Reiterated 2. POW#3 Phaco/ PCL Standard w/ LenSx OS- doing well. Myopic Goal.  Use Prednisolone BID OS && Prolensa Qdaily OS: Use through completion of po gtt regimen.  RTC as scheduled

## 2022-02-17 ENCOUNTER — POST-OP (OUTPATIENT)
Dept: URBAN - METROPOLITAN AREA CLINIC 1 | Facility: CLINIC | Age: 82
End: 2022-02-17

## 2022-02-17 DIAGNOSIS — Z96.1: ICD-10-CM

## 2022-02-17 PROCEDURE — 99024 POSTOP FOLLOW-UP VISIT: CPT

## 2022-02-17 ASSESSMENT — VISUAL ACUITY
OS_SC: 20/60
OD_SC: 20/50-1

## 2022-02-17 ASSESSMENT — TONOMETRY
OS_IOP_MMHG: 16
OD_IOP_MMHG: 20

## 2022-02-23 PROBLEM — Z96.1: Noted: 2022-02-23

## 2022-04-02 ASSESSMENT — TONOMETRY
OD_IOP_MMHG: 21
OS_IOP_MMHG: 19
OD_IOP_MMHG: 22
OD_IOP_MMHG: 16
OD_IOP_MMHG: 24
OS_IOP_MMHG: 18
OS_IOP_MMHG: 22
OD_IOP_MMHG: 21
OS_IOP_MMHG: 30
OD_IOP_MMHG: 22
OD_IOP_MMHG: 18
OD_IOP_MMHG: 28
OS_IOP_MMHG: 22
OS_IOP_MMHG: 21
OS_IOP_MMHG: 20
OS_IOP_MMHG: 17
OS_IOP_MMHG: 21

## 2022-04-02 ASSESSMENT — VISUAL ACUITY
OD_CC: J3
OD_CC: 20/400
OS_SC: 20/20
OS_SC: 20/25-1
OS_SC: 20/40+2
OS_SC: 20/30
OD_SC: 20/30
OS_SC: J2
OD_SC: 20/25
OD_GLARE: 20/400
OS_SC: 20/30
OS_GLARE: 20/400
OS_CC: 20/400
OD_CC: 20/400
OD_GLARE: 20/400
OD_CC: J1
OS_CC: 20/400
OS_CC: 20/60
OS_GLARE: 20/400
OD_SC: 20/50
OD_CC: 20/400
OD_GLARE: 20/400
OS_CC: J2
OD_GLARE: 20/400
OD_SC: 20/20
OD_GLARE: 20/400
OD_GLARE: 20/400
OD_SC: 20/25-1
OD_CC: 20/30
OS_SC: 20/30
OS_CC: J1
OD_CC: 20/400
OS_CC: 20/400
OD_CC: J1
OS_GLARE: 20/400
OS_CC: J1
OS_CC: 20/400
OS_CC: 20/40
OD_SC: 20/20
OS_CC: J2
OD_SC: 20/50
OS_SC: 20/20
OD_CC: 20/400
OD_CC: J1
OS_GLARE: 20/400
OS_CC: J1
OD_CC: J3
OS_CC: 20/80
OS_GLARE: 20/400

## 2022-10-28 ENCOUNTER — COMPREHENSIVE EXAM (OUTPATIENT)
Dept: URBAN - METROPOLITAN AREA CLINIC 1 | Facility: CLINIC | Age: 82
End: 2022-10-28

## 2022-10-28 DIAGNOSIS — H35.033: ICD-10-CM

## 2022-10-28 DIAGNOSIS — H40.053: ICD-10-CM

## 2022-10-28 DIAGNOSIS — H04.123: ICD-10-CM

## 2022-10-28 DIAGNOSIS — Z96.1: ICD-10-CM

## 2022-10-28 DIAGNOSIS — H16.143: ICD-10-CM

## 2022-10-28 DIAGNOSIS — D23.122: ICD-10-CM

## 2022-10-28 DIAGNOSIS — H43.813: ICD-10-CM

## 2022-10-28 PROCEDURE — 99214 OFFICE O/P EST MOD 30 MIN: CPT

## 2022-10-28 ASSESSMENT — TONOMETRY
OD_IOP_MMHG: 12
OS_IOP_MMHG: 12

## 2022-10-28 ASSESSMENT — VISUAL ACUITY
OD_CC: J1
OD_CC: 20/20
OS_CC: J1
OS_CC: 20/20

## 2023-05-04 ENCOUNTER — EMERGENCY VISIT (OUTPATIENT)
Dept: URBAN - METROPOLITAN AREA CLINIC 1 | Facility: CLINIC | Age: 83
End: 2023-05-04

## 2023-05-04 DIAGNOSIS — D23.111: ICD-10-CM

## 2023-05-04 PROCEDURE — 99213 OFFICE O/P EST LOW 20 MIN: CPT

## 2023-05-04 ASSESSMENT — VISUAL ACUITY
OS_CC: 20/20-1
OD_CC: 20/20-2

## 2023-05-04 ASSESSMENT — TONOMETRY
OD_IOP_MMHG: 16
OS_IOP_MMHG: 16

## 2023-05-05 ENCOUNTER — CLINIC PROCEDURE ONLY (OUTPATIENT)
Dept: URBAN - METROPOLITAN AREA CLINIC 1 | Facility: CLINIC | Age: 83
End: 2023-05-05

## 2023-05-05 DIAGNOSIS — D23.111: ICD-10-CM

## 2023-05-05 PROCEDURE — 11440 EXC FACE-MM B9+MARG 0.5 CM/<: CPT

## 2024-01-16 ENCOUNTER — COMPREHENSIVE EXAM (OUTPATIENT)
Dept: URBAN - METROPOLITAN AREA CLINIC 1 | Facility: CLINIC | Age: 84
End: 2024-01-16

## 2024-01-16 DIAGNOSIS — H35.033: ICD-10-CM

## 2024-01-16 DIAGNOSIS — H04.123: ICD-10-CM

## 2024-01-16 DIAGNOSIS — H43.813: ICD-10-CM

## 2024-01-16 DIAGNOSIS — H16.143: ICD-10-CM

## 2024-01-16 DIAGNOSIS — Z96.1: ICD-10-CM

## 2024-01-16 PROCEDURE — 99214 OFFICE O/P EST MOD 30 MIN: CPT

## 2024-01-16 ASSESSMENT — TONOMETRY
OS_IOP_MMHG: 14
OD_IOP_MMHG: 14

## 2024-01-16 ASSESSMENT — VISUAL ACUITY
OS_CC: 20/20
OD_CC: 20/25

## 2025-01-17 ENCOUNTER — COMPREHENSIVE EXAM (OUTPATIENT)
Age: 85
End: 2025-01-17

## 2025-01-17 DIAGNOSIS — H16.143: ICD-10-CM

## 2025-01-17 DIAGNOSIS — H04.123: ICD-10-CM

## 2025-01-17 DIAGNOSIS — Z96.1: ICD-10-CM

## 2025-01-17 DIAGNOSIS — H35.033: ICD-10-CM

## 2025-01-17 PROCEDURE — 92015 DETERMINE REFRACTIVE STATE: CPT

## 2025-01-17 PROCEDURE — 99214 OFFICE O/P EST MOD 30 MIN: CPT
